# Patient Record
Sex: MALE | Race: OTHER | HISPANIC OR LATINO | URBAN - METROPOLITAN AREA
[De-identification: names, ages, dates, MRNs, and addresses within clinical notes are randomized per-mention and may not be internally consistent; named-entity substitution may affect disease eponyms.]

---

## 2021-07-02 ENCOUNTER — EMERGENCY (EMERGENCY)
Facility: HOSPITAL | Age: 26
LOS: 1 days | Discharge: HOME | End: 2021-07-02
Attending: STUDENT IN AN ORGANIZED HEALTH CARE EDUCATION/TRAINING PROGRAM
Payer: MEDICAID

## 2021-07-02 VITALS
SYSTOLIC BLOOD PRESSURE: 107 MMHG | DIASTOLIC BLOOD PRESSURE: 67 MMHG | HEART RATE: 73 BPM | HEIGHT: 67 IN | OXYGEN SATURATION: 97 % | WEIGHT: 257.94 LBS | TEMPERATURE: 98 F | RESPIRATION RATE: 16 BRPM

## 2021-07-02 PROCEDURE — 71046 X-RAY EXAM CHEST 2 VIEWS: CPT | Mod: 26

## 2021-07-02 PROCEDURE — 99284 EMERGENCY DEPT VISIT MOD MDM: CPT

## 2021-07-02 NOTE — ED PROVIDER NOTE - NSFOLLOWUPINSTRUCTIONS_ED_ALL_ED_FT
You were seen in the emergency room today for cough. Your chest xray is normal. Please call for your viral swab results in the morning and call your primary doctor to inform them of this ER visit and obtain the next available appointment within the next 5 days. As we discussed, return to the ER if you have any worsening symptoms.    We no longer feel that you need further emergency care or admission to the hospital at this time.    While we have determined that you are currently stable for discharge, we know that things can change. Please seek immediate medical attention or return to the ER if you experience any of the following:  Any worsening or persistent symptoms  Severe Pain  Chest Pain  Difficulty Breathing  Bleeding  Passing Out  Severe Rash  Inability to Eat or Drink  Persistent Fever    Please see a primary care doctor or specialist within 5 days to ensure that you are improving.    Please call the [employer] phone numbers on this document if you have any problems obtaining a follow up appointment.    I wish you well! -Dr Page

## 2021-07-02 NOTE — ED PROVIDER NOTE - OBJECTIVE STATEMENT
24 y/o male with history of anxiety/depression, presenting with 2-3 days of dry cough, nasal congestion, and nausea without vomiting and no other associated symptoms. Patient took no meds prior to arrival. Patient denies any obvious sick contacts or international travel. Patient denies sore throat, pain anywhere, vomiting, diarrhea, chest pain, shortness of breath, fever, syncope, or any other recent illnesses and hospitalizations.

## 2021-07-02 NOTE — ED PROVIDER NOTE - PATIENT PORTAL LINK FT
You can access the FollowMyHealth Patient Portal offered by [facility] by registering at the following website: [email or web address]. By joining [employer]’s FollowMyHealth portal, you will also be able to view your health information using other applications (apps) compatible with our system.

## 2021-07-02 NOTE — ED PROVIDER NOTE - PHYSICAL EXAMINATION
Vital Signs Reviewed  GEN: Comfortable, NAD, AAOx3  HEENT: NCAT, MMM, Neck Supple, rhinorrhea,  RESP: CTAB, No rales/rhonchi/wheezing  CV: RRR, S1S2, No murmurs  ABD: No TTP, Soft, ND, No masses, No CVA Tenderness  Extrem/Skin: Equal pulses bilat, No cyanosis/edema/rashes  Neuro: No focal deficits Vital Signs Reviewed  GEN: Comfortable, NAD, AAOx3  HEENT: NCAT, MMM, Neck Supple, Rhinorrhea,  RESP: CTAB, No rales/rhonchi/wheezing  CV: RRR, S1S2, No murmurs  ABD: No TTP, Soft, ND, No masses, No CVA Tenderness  Extrem/Skin: Equal pulses bilat, No cyanosis/edema/rashes  Neuro: No focal deficits

## 2021-07-02 NOTE — ED PROVIDER NOTE - CLINICAL SUMMARY MEDICAL DECISION MAKING FREE TEXT BOX
Patient presenting with 2-3 days of viral symptoms. Will obtain CXR, viral panel. Patient stable and will reassess. Patient presenting with 2-3 days of viral symptoms. Will obtain CXR, viral panel. Patient stable and will reassess.    CXR wnl and RN to swab pt. No new symptoms on reassessment. Pt to call for swab results. Most likely viral URI vs other non emergent etiology of symptoms- the details of the case, history, and exam make more emergent diagnoses much less likely. Discussed with pt my clinical impression and results, patient given strict return precautions if persistent or worsening of symptoms occurs, and need for close follow up. Pt expressed understanding and agrees with plan. Pt is well appearing with a reassuring exam. Discharge home with PMD or Specialist f/u within 5 days.

## 2021-07-03 LAB
RAPID RVP RESULT: SIGNIFICANT CHANGE UP
SARS-COV-2 RNA SPEC QL NAA+PROBE: SIGNIFICANT CHANGE UP

## 2021-07-03 PROCEDURE — 99283 EMERGENCY DEPT VISIT LOW MDM: CPT | Mod: 25

## 2021-07-03 PROCEDURE — 71046 X-RAY EXAM CHEST 2 VIEWS: CPT

## 2021-07-03 PROCEDURE — 0225U NFCT DS DNA&RNA 21 SARSCOV2: CPT

## 2021-08-01 ENCOUNTER — EMERGENCY (EMERGENCY)
Facility: HOSPITAL | Age: 26
LOS: 1 days | Discharge: HOME | End: 2021-08-01
Attending: STUDENT IN AN ORGANIZED HEALTH CARE EDUCATION/TRAINING PROGRAM
Payer: MEDICAID

## 2021-08-01 VITALS
OXYGEN SATURATION: 94 % | SYSTOLIC BLOOD PRESSURE: 109 MMHG | HEART RATE: 78 BPM | WEIGHT: 235.89 LBS | RESPIRATION RATE: 18 BRPM | HEIGHT: 67 IN | TEMPERATURE: 98 F | DIASTOLIC BLOOD PRESSURE: 71 MMHG

## 2021-08-01 LAB
ALBUMIN SERPL ELPH-MCNC: 3.9 G/DL — SIGNIFICANT CHANGE UP (ref 3.5–5)
ALP SERPL-CCNC: 121 U/L — HIGH (ref 40–120)
ALT FLD-CCNC: 26 U/L DA — SIGNIFICANT CHANGE UP (ref 10–60)
ANION GAP SERPL CALC-SCNC: 8 MMOL/L — SIGNIFICANT CHANGE UP (ref 5–17)
AST SERPL-CCNC: 16 U/L — SIGNIFICANT CHANGE UP (ref 10–40)
BASOPHILS # BLD AUTO: 0.05 K/UL — SIGNIFICANT CHANGE UP (ref 0–0.2)
BASOPHILS NFR BLD AUTO: 0.9 % — SIGNIFICANT CHANGE UP (ref 0–2)
BILIRUB SERPL-MCNC: 0.4 MG/DL — SIGNIFICANT CHANGE UP (ref 0.2–1.2)
BUN SERPL-MCNC: 12 MG/DL — SIGNIFICANT CHANGE UP (ref 7–18)
CALCIUM SERPL-MCNC: 9.1 MG/DL — SIGNIFICANT CHANGE UP (ref 8.4–10.5)
CHLORIDE SERPL-SCNC: 106 MMOL/L — SIGNIFICANT CHANGE UP (ref 96–108)
CO2 SERPL-SCNC: 26 MMOL/L — SIGNIFICANT CHANGE UP (ref 22–31)
CREAT SERPL-MCNC: 0.94 MG/DL — SIGNIFICANT CHANGE UP (ref 0.5–1.3)
EOSINOPHIL # BLD AUTO: 0.17 K/UL — SIGNIFICANT CHANGE UP (ref 0–0.5)
EOSINOPHIL NFR BLD AUTO: 3 % — SIGNIFICANT CHANGE UP (ref 0–6)
GLUCOSE SERPL-MCNC: 81 MG/DL — SIGNIFICANT CHANGE UP (ref 70–99)
HCT VFR BLD CALC: 45.2 % — SIGNIFICANT CHANGE UP (ref 39–50)
HGB BLD-MCNC: 14.6 G/DL — SIGNIFICANT CHANGE UP (ref 13–17)
IMM GRANULOCYTES NFR BLD AUTO: 0.4 % — SIGNIFICANT CHANGE UP (ref 0–1.5)
LYMPHOCYTES # BLD AUTO: 1.4 K/UL — SIGNIFICANT CHANGE UP (ref 1–3.3)
LYMPHOCYTES # BLD AUTO: 24.6 % — SIGNIFICANT CHANGE UP (ref 13–44)
MCHC RBC-ENTMCNC: 27.1 PG — SIGNIFICANT CHANGE UP (ref 27–34)
MCHC RBC-ENTMCNC: 32.3 GM/DL — SIGNIFICANT CHANGE UP (ref 32–36)
MCV RBC AUTO: 84 FL — SIGNIFICANT CHANGE UP (ref 80–100)
MONOCYTES # BLD AUTO: 0.4 K/UL — SIGNIFICANT CHANGE UP (ref 0–0.9)
MONOCYTES NFR BLD AUTO: 7 % — SIGNIFICANT CHANGE UP (ref 2–14)
NEUTROPHILS # BLD AUTO: 3.65 K/UL — SIGNIFICANT CHANGE UP (ref 1.8–7.4)
NEUTROPHILS NFR BLD AUTO: 64.1 % — SIGNIFICANT CHANGE UP (ref 43–77)
NRBC # BLD: 0 /100 WBCS — SIGNIFICANT CHANGE UP (ref 0–0)
PLATELET # BLD AUTO: 243 K/UL — SIGNIFICANT CHANGE UP (ref 150–400)
POTASSIUM SERPL-MCNC: 4 MMOL/L — SIGNIFICANT CHANGE UP (ref 3.5–5.3)
POTASSIUM SERPL-SCNC: 4 MMOL/L — SIGNIFICANT CHANGE UP (ref 3.5–5.3)
PROT SERPL-MCNC: 7.6 G/DL — SIGNIFICANT CHANGE UP (ref 6–8.3)
RBC # BLD: 5.38 M/UL — SIGNIFICANT CHANGE UP (ref 4.2–5.8)
RBC # FLD: 14.6 % — HIGH (ref 10.3–14.5)
SARS-COV-2 RNA SPEC QL NAA+PROBE: SIGNIFICANT CHANGE UP
SODIUM SERPL-SCNC: 140 MMOL/L — SIGNIFICANT CHANGE UP (ref 135–145)
WBC # BLD: 5.69 K/UL — SIGNIFICANT CHANGE UP (ref 3.8–10.5)
WBC # FLD AUTO: 5.69 K/UL — SIGNIFICANT CHANGE UP (ref 3.8–10.5)

## 2021-08-01 PROCEDURE — 99284 EMERGENCY DEPT VISIT MOD MDM: CPT

## 2021-08-01 PROCEDURE — 71045 X-RAY EXAM CHEST 1 VIEW: CPT | Mod: 26

## 2021-08-01 RX ORDER — SODIUM CHLORIDE 9 MG/ML
1000 INJECTION INTRAMUSCULAR; INTRAVENOUS; SUBCUTANEOUS ONCE
Refills: 0 | Status: COMPLETED | OUTPATIENT
Start: 2021-08-01 | End: 2021-08-01

## 2021-08-01 RX ORDER — KETOROLAC TROMETHAMINE 30 MG/ML
15 SYRINGE (ML) INJECTION ONCE
Refills: 0 | Status: DISCONTINUED | OUTPATIENT
Start: 2021-08-01 | End: 2021-08-01

## 2021-08-01 RX ORDER — ONDANSETRON 8 MG/1
4 TABLET, FILM COATED ORAL ONCE
Refills: 0 | Status: COMPLETED | OUTPATIENT
Start: 2021-08-01 | End: 2021-08-01

## 2021-08-01 RX ORDER — CLONAZEPAM 1 MG
0.5 TABLET ORAL ONCE
Refills: 0 | Status: DISCONTINUED | OUTPATIENT
Start: 2021-08-01 | End: 2021-08-01

## 2021-08-01 RX ADMIN — Medication 0.5 MILLIGRAM(S): at 23:43

## 2021-08-01 RX ADMIN — SODIUM CHLORIDE 1000 MILLILITER(S): 9 INJECTION INTRAMUSCULAR; INTRAVENOUS; SUBCUTANEOUS at 22:12

## 2021-08-01 RX ADMIN — Medication 15 MILLIGRAM(S): at 23:43

## 2021-08-01 RX ADMIN — ONDANSETRON 4 MILLIGRAM(S): 8 TABLET, FILM COATED ORAL at 23:43

## 2021-08-02 ENCOUNTER — EMERGENCY (EMERGENCY)
Facility: HOSPITAL | Age: 26
LOS: 1 days | Discharge: HOME | End: 2021-08-02
Attending: EMERGENCY MEDICINE
Payer: MEDICAID

## 2021-08-02 VITALS
HEART RATE: 85 BPM | OXYGEN SATURATION: 98 % | DIASTOLIC BLOOD PRESSURE: 67 MMHG | SYSTOLIC BLOOD PRESSURE: 114 MMHG | RESPIRATION RATE: 17 BRPM | HEIGHT: 67 IN | TEMPERATURE: 98 F | WEIGHT: 235.89 LBS

## 2021-08-02 VITALS
RESPIRATION RATE: 18 BRPM | HEART RATE: 77 BPM | DIASTOLIC BLOOD PRESSURE: 75 MMHG | TEMPERATURE: 98 F | SYSTOLIC BLOOD PRESSURE: 110 MMHG | OXYGEN SATURATION: 95 %

## 2021-08-02 PROBLEM — F41.9 ANXIETY DISORDER, UNSPECIFIED: Chronic | Status: ACTIVE | Noted: 2021-07-03

## 2021-08-02 PROBLEM — F32.9 MAJOR DEPRESSIVE DISORDER, SINGLE EPISODE, UNSPECIFIED: Chronic | Status: ACTIVE | Noted: 2021-07-03

## 2021-08-02 LAB
APPEARANCE UR: CLEAR — SIGNIFICANT CHANGE UP
BACTERIA # UR AUTO: ABNORMAL /HPF
BILIRUB UR-MCNC: NEGATIVE — SIGNIFICANT CHANGE UP
COLOR SPEC: YELLOW — SIGNIFICANT CHANGE UP
DIFF PNL FLD: NEGATIVE — SIGNIFICANT CHANGE UP
EPI CELLS # UR: SIGNIFICANT CHANGE UP /HPF
GLUCOSE UR QL: NEGATIVE — SIGNIFICANT CHANGE UP
KETONES UR-MCNC: NEGATIVE — SIGNIFICANT CHANGE UP
LEUKOCYTE ESTERASE UR-ACNC: NEGATIVE — SIGNIFICANT CHANGE UP
NITRITE UR-MCNC: NEGATIVE — SIGNIFICANT CHANGE UP
PH UR: 5 — SIGNIFICANT CHANGE UP (ref 5–8)
PROT UR-MCNC: NEGATIVE — SIGNIFICANT CHANGE UP
RBC CASTS # UR COMP ASSIST: SIGNIFICANT CHANGE UP /HPF (ref 0–2)
SP GR SPEC: 1.01 — SIGNIFICANT CHANGE UP (ref 1.01–1.02)
UROBILINOGEN FLD QL: NEGATIVE — SIGNIFICANT CHANGE UP
WBC UR QL: SIGNIFICANT CHANGE UP /HPF (ref 0–5)

## 2021-08-02 PROCEDURE — 85025 COMPLETE CBC W/AUTO DIFF WBC: CPT

## 2021-08-02 PROCEDURE — 96374 THER/PROPH/DIAG INJ IV PUSH: CPT

## 2021-08-02 PROCEDURE — 81001 URINALYSIS AUTO W/SCOPE: CPT

## 2021-08-02 PROCEDURE — 96375 TX/PRO/DX INJ NEW DRUG ADDON: CPT

## 2021-08-02 PROCEDURE — 99285 EMERGENCY DEPT VISIT HI MDM: CPT

## 2021-08-02 PROCEDURE — 99284 EMERGENCY DEPT VISIT MOD MDM: CPT | Mod: 25

## 2021-08-02 PROCEDURE — 87635 SARS-COV-2 COVID-19 AMP PRB: CPT

## 2021-08-02 PROCEDURE — 87086 URINE CULTURE/COLONY COUNT: CPT

## 2021-08-02 PROCEDURE — 80053 COMPREHEN METABOLIC PANEL: CPT

## 2021-08-02 PROCEDURE — 71045 X-RAY EXAM CHEST 1 VIEW: CPT

## 2021-08-02 PROCEDURE — 36415 COLL VENOUS BLD VENIPUNCTURE: CPT

## 2021-08-02 PROCEDURE — 93005 ELECTROCARDIOGRAM TRACING: CPT

## 2021-08-02 RX ORDER — KETOROLAC TROMETHAMINE 30 MG/ML
30 SYRINGE (ML) INJECTION ONCE
Refills: 0 | Status: DISCONTINUED | OUTPATIENT
Start: 2021-08-02 | End: 2021-08-02

## 2021-08-02 RX ORDER — ONDANSETRON 8 MG/1
4 TABLET, FILM COATED ORAL ONCE
Refills: 0 | Status: COMPLETED | OUTPATIENT
Start: 2021-08-02 | End: 2021-08-03

## 2021-08-02 NOTE — ED PROVIDER NOTE - PHYSICAL EXAMINATION
Vital Signs Reviewed  GEN: Comfortable, Conversant in full sentences, NAD, AAOx3  HEENT: NCAT, MMM, Neck Supple  RESP: CTAB, No rales/rhonchi/wheezing  CV: RRR, S1S2, No murmurs  ABD: No TTP, Soft, ND, No masses, No CVA Tenderness  Extrem/Skin: Equal pulses bilat, No cyanosis/edema/rashes  Neuro: No focal deficits

## 2021-08-02 NOTE — ED PROVIDER NOTE - OBJECTIVE STATEMENT
26 yo M h/o anxiety/depression p/w 3 days of viral symptoms including nasal congestion, cough, nausea w/o vomiting, and intermittent chest pain with coughing. No recent fever/sick contacts/intl travel. Pt also c/o intermittent dysuria and requesting his nightly anxiety med. Pt denies recent diarrhea, SOB, focal numbness/weakness, syncope, other recent illness or hospitalizations.

## 2021-08-02 NOTE — ED ADULT TRIAGE NOTE - CHIEF COMPLAINT QUOTE
BIBEMS for chest pain since yesterday with nausea.  He states that he also has epigastric pain, dizziness and weakness; denies sick contact.  H/O Anxiety, schizophrenia; taking Clonazepam, Escitalopram.  He appears calm, breathing easily, no guarding, and speaking in full sentences.

## 2021-08-02 NOTE — ED PROVIDER NOTE - NS ED ROS FT
Patient Reports No  Fever/Chills  Vomiting/Diarrhea  Shortness of Breath  Syncope, Focal Numbness or Weakness  Other Recent Illness or Hospitalizations

## 2021-08-02 NOTE — ED PROVIDER NOTE - CLINICAL SUMMARY MEDICAL DECISION MAKING FREE TEXT BOX
Pt p/w several days of viral symptoms also c/o dysuria. Initially 94% on RA and otw completely benign exam. Labs and urine wnl. EKG and CXR unremarkable.     On reassessment pt states that he's feeling well. Rec PMD f/u within 3 days. Most likely non emergent etiology of symptoms- the details of the case, history, and exam make more emergent diagnoses much less likely. Discussed with pt my clinical impression and results, patient given strict return precautions if persistent or worsening of symptoms occurs, and need for close follow up. Pt expressed understanding and agrees with plan. Pt is well appearing with a reassuring exam. Discharge home with PMD or Specialist f/u within 3 days.

## 2021-08-02 NOTE — ED PROVIDER NOTE - PATIENT PORTAL LINK FT
You can access the FollowMyHealth Patient Portal offered by [facility] by registering at the following website: [email or web address]. By joining [employer]’s FollowMyHealth portal, you will also be able to view your health information using other applications (apps) compatible with our system.

## 2021-08-03 VITALS
DIASTOLIC BLOOD PRESSURE: 74 MMHG | HEART RATE: 76 BPM | TEMPERATURE: 98 F | OXYGEN SATURATION: 98 % | RESPIRATION RATE: 17 BRPM | SYSTOLIC BLOOD PRESSURE: 110 MMHG

## 2021-08-03 LAB
ALBUMIN SERPL ELPH-MCNC: 3.8 G/DL — SIGNIFICANT CHANGE UP (ref 3.5–5)
ALP SERPL-CCNC: 121 U/L — HIGH (ref 40–120)
ALT FLD-CCNC: 27 U/L DA — SIGNIFICANT CHANGE UP (ref 10–60)
ANION GAP SERPL CALC-SCNC: 8 MMOL/L — SIGNIFICANT CHANGE UP (ref 5–17)
AST SERPL-CCNC: 17 U/L — SIGNIFICANT CHANGE UP (ref 10–40)
BASOPHILS # BLD AUTO: 0.07 K/UL — SIGNIFICANT CHANGE UP (ref 0–0.2)
BASOPHILS NFR BLD AUTO: 1.2 % — SIGNIFICANT CHANGE UP (ref 0–2)
BILIRUB SERPL-MCNC: 0.4 MG/DL — SIGNIFICANT CHANGE UP (ref 0.2–1.2)
BUN SERPL-MCNC: 16 MG/DL — SIGNIFICANT CHANGE UP (ref 7–18)
CALCIUM SERPL-MCNC: 9.3 MG/DL — SIGNIFICANT CHANGE UP (ref 8.4–10.5)
CHLORIDE SERPL-SCNC: 107 MMOL/L — SIGNIFICANT CHANGE UP (ref 96–108)
CO2 SERPL-SCNC: 27 MMOL/L — SIGNIFICANT CHANGE UP (ref 22–31)
CREAT SERPL-MCNC: 1.18 MG/DL — SIGNIFICANT CHANGE UP (ref 0.5–1.3)
CULTURE RESULTS: SIGNIFICANT CHANGE UP
D DIMER BLD IA.RAPID-MCNC: <150 NG/ML DDU — SIGNIFICANT CHANGE UP
EOSINOPHIL # BLD AUTO: 0.02 K/UL — SIGNIFICANT CHANGE UP (ref 0–0.5)
EOSINOPHIL NFR BLD AUTO: 0.4 % — SIGNIFICANT CHANGE UP (ref 0–6)
GLUCOSE SERPL-MCNC: 90 MG/DL — SIGNIFICANT CHANGE UP (ref 70–99)
HCT VFR BLD CALC: 45.8 % — SIGNIFICANT CHANGE UP (ref 39–50)
HGB BLD-MCNC: 14.4 G/DL — SIGNIFICANT CHANGE UP (ref 13–17)
IMM GRANULOCYTES NFR BLD AUTO: 0.2 % — SIGNIFICANT CHANGE UP (ref 0–1.5)
LIDOCAIN IGE QN: 127 U/L — SIGNIFICANT CHANGE UP (ref 73–393)
LYMPHOCYTES # BLD AUTO: 1.73 K/UL — SIGNIFICANT CHANGE UP (ref 1–3.3)
LYMPHOCYTES # BLD AUTO: 30.6 % — SIGNIFICANT CHANGE UP (ref 13–44)
MCHC RBC-ENTMCNC: 26.6 PG — LOW (ref 27–34)
MCHC RBC-ENTMCNC: 31.4 GM/DL — LOW (ref 32–36)
MCV RBC AUTO: 84.7 FL — SIGNIFICANT CHANGE UP (ref 80–100)
MONOCYTES # BLD AUTO: 0.58 K/UL — SIGNIFICANT CHANGE UP (ref 0–0.9)
MONOCYTES NFR BLD AUTO: 10.2 % — SIGNIFICANT CHANGE UP (ref 2–14)
NEUTROPHILS # BLD AUTO: 3.25 K/UL — SIGNIFICANT CHANGE UP (ref 1.8–7.4)
NEUTROPHILS NFR BLD AUTO: 57.4 % — SIGNIFICANT CHANGE UP (ref 43–77)
NRBC # BLD: 0 /100 WBCS — SIGNIFICANT CHANGE UP (ref 0–0)
PLATELET # BLD AUTO: 258 K/UL — SIGNIFICANT CHANGE UP (ref 150–400)
POTASSIUM SERPL-MCNC: 4.2 MMOL/L — SIGNIFICANT CHANGE UP (ref 3.5–5.3)
POTASSIUM SERPL-SCNC: 4.2 MMOL/L — SIGNIFICANT CHANGE UP (ref 3.5–5.3)
PROT SERPL-MCNC: 7.8 G/DL — SIGNIFICANT CHANGE UP (ref 6–8.3)
RBC # BLD: 5.41 M/UL — SIGNIFICANT CHANGE UP (ref 4.2–5.8)
RBC # FLD: 14.3 % — SIGNIFICANT CHANGE UP (ref 10.3–14.5)
SODIUM SERPL-SCNC: 142 MMOL/L — SIGNIFICANT CHANGE UP (ref 135–145)
SPECIMEN SOURCE: SIGNIFICANT CHANGE UP
TROPONIN I SERPL-MCNC: <0.015 NG/ML — SIGNIFICANT CHANGE UP (ref 0–0.04)
WBC # BLD: 5.66 K/UL — SIGNIFICANT CHANGE UP (ref 3.8–10.5)
WBC # FLD AUTO: 5.66 K/UL — SIGNIFICANT CHANGE UP (ref 3.8–10.5)

## 2021-08-03 PROCEDURE — 96374 THER/PROPH/DIAG INJ IV PUSH: CPT

## 2021-08-03 PROCEDURE — 93005 ELECTROCARDIOGRAM TRACING: CPT

## 2021-08-03 PROCEDURE — 80053 COMPREHEN METABOLIC PANEL: CPT

## 2021-08-03 PROCEDURE — 85025 COMPLETE CBC W/AUTO DIFF WBC: CPT

## 2021-08-03 PROCEDURE — 74176 CT ABD & PELVIS W/O CONTRAST: CPT | Mod: 26,MA

## 2021-08-03 PROCEDURE — 84484 ASSAY OF TROPONIN QUANT: CPT

## 2021-08-03 PROCEDURE — 71045 X-RAY EXAM CHEST 1 VIEW: CPT | Mod: 26

## 2021-08-03 PROCEDURE — 96375 TX/PRO/DX INJ NEW DRUG ADDON: CPT

## 2021-08-03 PROCEDURE — 71045 X-RAY EXAM CHEST 1 VIEW: CPT

## 2021-08-03 PROCEDURE — 36415 COLL VENOUS BLD VENIPUNCTURE: CPT

## 2021-08-03 PROCEDURE — 83690 ASSAY OF LIPASE: CPT

## 2021-08-03 PROCEDURE — 99284 EMERGENCY DEPT VISIT MOD MDM: CPT | Mod: 25

## 2021-08-03 PROCEDURE — 85379 FIBRIN DEGRADATION QUANT: CPT

## 2021-08-03 PROCEDURE — 74176 CT ABD & PELVIS W/O CONTRAST: CPT | Mod: MA

## 2021-08-03 RX ADMIN — ONDANSETRON 4 MILLIGRAM(S): 8 TABLET, FILM COATED ORAL at 00:28

## 2021-08-03 RX ADMIN — Medication 30 MILLIGRAM(S): at 00:28

## 2021-08-03 NOTE — ED PROVIDER NOTE - PATIENT PORTAL LINK FT
You can access the FollowMyHealth Patient Portal offered by [facility] by registering at the following website: [email or web address]. By joining [employer]’s FollowMyHealth portal, you will also be able to view your health information using other applications (apps) compatible with our system.

## 2021-08-03 NOTE — ED PROVIDER NOTE - OBJECTIVE STATEMENT
24yo M with hx anxiety and depression presents with cough, chest pain, abdominal pain and nausea. Reports onset 1 week ago. Seen in ED yesterday but reports symptoms worsened. Denies sputum production, fever, vomiting, diarrhea or urinary symptoms. Denies sick contacts or recent travel.

## 2021-08-03 NOTE — ED PROVIDER NOTE - NSFOLLOWUPINSTRUCTIONS_ED_ALL_ED_FT
Abdominal Pain    WHAT YOU NEED TO KNOW:    Abdominal pain can be dull, achy, or sharp. You may have pain in one area of your abdomen, or in your entire abdomen. Your pain may be caused by a condition such as constipation, food sensitivity or poisoning, infection, or a blockage. Abdominal pain can also be from a hernia, appendicitis, or an ulcer. Liver, gallbladder, or kidney conditions can also cause abdominal pain. The cause of your abdominal pain may not be known.    Abdominal Organs         DISCHARGE INSTRUCTIONS:    Call your local emergency number (911 in the [place]) if:   •You have new chest pain or shortness of breath.          Return to the emergency department if:   •You have pulsing pain in your upper abdomen or lower back that suddenly becomes constant.      •Your pain is in the right lower abdominal area and worsens with movement.      •You have a fever over 100.4°F (38°C) or shaking chills.      •You are vomiting and cannot keep food or liquids down.      •Your pain does not improve or gets worse over the next 8 to 12 hours.      •You see blood in your vomit or bowel movements, or they look black and tarry.      •Your skin or the whites of your eyes turn yellow.      •You are a woman and have a large amount of vaginal bleeding that is not your monthly period.      Call your doctor if:   •You have pain in your lower back.      •You are a man and have pain in your testicles.      •You have pain when you urinate.      •You have questions or concerns about your condition or care.      Medicines:   •Prescription pain medicine may be given. Ask your healthcare provider how to take this medicine safely. Some prescription pain medicines contain acetaminophen. Do not take other medicines that contain acetaminophen without talking to your healthcare provider. Too much acetaminophen may cause liver damage. Prescription pain medicine may cause constipation. Ask your healthcare provider how to prevent or treat constipation.       •Medicines may be given to calm your stomach or prevent vomiting.      •Take your medicine as directed. Contact your healthcare provider if you think your medicine is not helping or if you have side effects. Tell him of her if you are allergic to any medicine. Keep a list of the medicines, vitamins, and herbs you take. Include the amounts, and when and why you take them. Bring the list or the pill bottles to follow-up visits. Carry your medicine list with you in case of an emergency.      Manage your symptoms:   •Apply heat on your abdomen for 20 to 30 minutes every 2 hours for as many days as directed. Heat helps decrease pain and muscle spasms.      •Make changes to the food you eat, if needed. Do not eat foods that cause abdominal pain or other symptoms. Eat small meals more often. The following changes may also help:?Eat more high-fiber foods if you are constipated. High-fiber foods include fruits, vegetables, whole-grain foods, and legumes.             ?Do not eat foods that cause gas if you have bloating. Examples include broccoli, cabbage, and cauliflower. Do not drink soda or carbonated drinks. These may also cause gas.      ?Do not eat foods or drinks that contain sorbitol or fructose if you have diarrhea and bloating. Some examples are fruit juices, candy, jelly, and sugar-free gum.      ?Do not eat high-fat foods. Examples include fried foods, cheeseburgers, hot dogs, and desserts.      ?Limit or do not have caffeine. Caffeine may make symptoms such as heartburn or nausea worse.      ?Drink more liquids to prevent dehydration from diarrhea or vomiting. Ask your healthcare provider how much liquid to drink each day and which liquids are best for you.      •Keep a diary of your abdominal pain. A diary may help your healthcare provider learn what is causing your abdominal pain. Include when the pain happens, how long it lasts, and what the pain feels like. Write down any other symptoms you have with abdominal pain. Also write down what you eat, and what symptoms you have after you eat.      •Manage your stress. Stress may cause abdominal pain. Your healthcare provider may recommend relaxation techniques and deep breathing exercises to help decrease your stress. Your healthcare provider may recommend you talk to someone about your stress or anxiety, such as a counselor or a trusted friend. Get plenty of sleep and exercise regularly.  [employer] Walking for Exercise           •Limit or do not drink alcohol. Alcohol can make your abdominal pain worse. Ask your healthcare provider if it is safe for you to drink alcohol. Also ask how much is safe for you to drink.      •Do not smoke. Nicotine and other chemicals in cigarettes can damage your esophagus and stomach. Ask your healthcare provider for information if you currently smoke and need help to quit. E-cigarettes or smokeless tobacco still contain nicotine. Talk to your healthcare provider before you use these products.      Follow up with your doctor within 24 hours or as directed: Write down your questions so you remember to ask them during your visits.

## 2021-08-03 NOTE — ED PROVIDER NOTE - CLINICAL SUMMARY MEDICAL DECISION MAKING FREE TEXT BOX
24yo M with hx anxiety and depression presents with cough, chest pain, abdominal pain and nausea. Will obtain ekg, labs, CXR, CT A/P. Given IVF, zofran and toradol, will reassess. 26yo M with hx anxiety and depression presents with cough, chest pain, abdominal pain and nausea. Will obtain ekg, labs, CXR, CT A/P. Given IVF, zofran and toradol, will reassess.    labs and CXR unremarkable,  CT A/P shows No acute abdominal pathology.  Discussed above with patient. Patient's mother called reporting he has hx paranoid schizophrenia and has not been taking his medication and did not sleep yesterday. Reports his mental health specialist recommended he get evaluated by psychiatry at [facility]. I discussed with her that I cannot transfer him to [place] for psych evaluation that can be performed in our ED and he cannot be transferred without his consent. Mother reports she will arrange for his outpatient mental health specialist to further assess him.  On reevaluation patient denies SI/HI and declines telepsych evaluation. patient stable for discharge.

## 2021-08-06 ENCOUNTER — EMERGENCY (EMERGENCY)
Facility: HOSPITAL | Age: 26
LOS: 1 days | Discharge: HOME | End: 2021-08-06
Attending: EMERGENCY MEDICINE
Payer: MEDICAID

## 2021-08-06 VITALS
SYSTOLIC BLOOD PRESSURE: 114 MMHG | WEIGHT: 238.1 LBS | HEIGHT: 67 IN | RESPIRATION RATE: 17 BRPM | TEMPERATURE: 98 F | OXYGEN SATURATION: 98 % | HEART RATE: 102 BPM | DIASTOLIC BLOOD PRESSURE: 74 MMHG

## 2021-08-06 LAB
APPEARANCE UR: CLEAR — SIGNIFICANT CHANGE UP
BACTERIA # UR AUTO: ABNORMAL /HPF
BILIRUB UR-MCNC: NEGATIVE — SIGNIFICANT CHANGE UP
COLOR SPEC: YELLOW — SIGNIFICANT CHANGE UP
DIFF PNL FLD: NEGATIVE — SIGNIFICANT CHANGE UP
EPI CELLS # UR: SIGNIFICANT CHANGE UP /HPF
GLUCOSE UR QL: NEGATIVE — SIGNIFICANT CHANGE UP
KETONES UR-MCNC: ABNORMAL
LEUKOCYTE ESTERASE UR-ACNC: NEGATIVE — SIGNIFICANT CHANGE UP
NITRITE UR-MCNC: NEGATIVE — SIGNIFICANT CHANGE UP
PH UR: 5 — SIGNIFICANT CHANGE UP (ref 5–8)
PROT UR-MCNC: 30 MG/DL
SP GR SPEC: 1.03 — HIGH (ref 1.01–1.02)
UROBILINOGEN FLD QL: NEGATIVE — SIGNIFICANT CHANGE UP
WBC UR QL: ABNORMAL /HPF (ref 0–5)

## 2021-08-06 PROCEDURE — 99283 EMERGENCY DEPT VISIT LOW MDM: CPT

## 2021-08-06 PROCEDURE — 87086 URINE CULTURE/COLONY COUNT: CPT

## 2021-08-06 PROCEDURE — 81001 URINALYSIS AUTO W/SCOPE: CPT

## 2021-08-06 PROCEDURE — 99284 EMERGENCY DEPT VISIT MOD MDM: CPT

## 2021-08-06 RX ADMIN — Medication 1 MILLIGRAM(S): at 19:41

## 2021-08-06 NOTE — ED PROVIDER NOTE - PATIENT PORTAL LINK FT
You can access the FollowMyHealth Patient Portal offered by [facility] by registering at the following website: [email or web address]. By joining [employer]’s FollowMyHealth portal, you will also be able to view your health information using other applications (apps) compatible with our system.

## 2021-08-06 NOTE — ED PROVIDER NOTE - CLINICAL SUMMARY MEDICAL DECISION MAKING FREE TEXT BOX
25 yr old male with hx of anxiety presents to ed c/o sob x 1 wk, not feeling well, dysuria, abd discomfort, fatigue, and wants to get a general check up. pt seen 3 days ago and had full work up and ct abd with no acute findings. pt denies any si/hi, drug use, alcohol use. no sti concerns    will r/o uti although I suspect supratentorial cause possibly somatoform - check ua, give ativan. give pcp referral.

## 2021-08-06 NOTE — ED ADULT NURSE NOTE - NSIMPLEMENTINTERV_GEN_ALL_ED
Implemented All Universal Safety Interventions:  [place] to call system. Call bell, personal items and telephone within reach. Instruct patient to call for assistance. Room bathroom lighting operational. Non-slip footwear when patient is off stretcher. Physically safe environment: no spills, clutter or unnecessary equipment. Stretcher in lowest position, wheels locked, appropriate side rails in place.

## 2021-08-06 NOTE — ED ADULT NURSE NOTE - OBJECTIVE STATEMENT
AS per pt, c/o pain upon urination and difficulty breathing x1 week. PT is able to speak in complete sentences and ambulate w/out difficulty or dyspneic on exertion. PT denies all other symptoms.

## 2021-08-06 NOTE — ED PROVIDER NOTE - OBJECTIVE STATEMENT
25 yr old male with hx of anxiety presents to ed c/o sob x 1 wk, not feeling well, dysuria, abd discomfort, fatigue, and wants to get a general check up. pt seen 3 days ago and had full work up and ct abd with no acute findings. pt denies any si/hi, drug use, alcohol use. no sti concerns

## 2021-08-06 NOTE — ED PROVIDER NOTE - PROGRESS NOTE DETAILS
anderson: urine clean. pt continously requesting to have ed perform full physical. pt informed needs to see primary MD. I highly suspect no organic cause of his sx rather psych induced. list of MD given. ua clean

## 2021-08-07 LAB
CULTURE RESULTS: NO GROWTH — SIGNIFICANT CHANGE UP
SPECIMEN SOURCE: SIGNIFICANT CHANGE UP

## 2022-02-28 NOTE — ED ADULT NURSE NOTE - EXTENSIONS OF SELF_ADULT
Postpartum Note    S: No acute events overnight. Pt reports that her pain has been well controlled. She has been up and ambulating in her room  She has been breastfeeding and is undecided on contraception at this time; she would like to discuss at her postpartum visits. Tolerating general diet well. Endorses moderate lochia. She is voiding spontaneously. +flatus -BM. Denies cp, sob, n/v/f/c, headache or calf tenderness.  She would like to go home today if possible.    O:  Vitals:    22 0339   BP: 99/64   Pulse: 63   Resp: 16   Temp: 98.1 °F (36.7 °C)       Gen: resting comfortably in bed, nad, conversant, pleasant    Resp: unlabored  CV: normal pulse  Abd:soft, mild tenderness to palpation, uterine fundus firm,  incision c/d/i with staples  Extrem: no calf tenderness to palpation, trace edema    Lab Results   Component Value Date    WBC 12.6 (H) 2022    HGB 9.2 (L) 2022    HCT 28.5 (L) 2022    MCV 80.7 2022     2022       Lab Results   Component Value Date    GLUCOSE 77 2021    CALCIUM 8.9 2021    CO2 25 2021    BUN 11 2021    CREATININE 0.62 2021       Lab Results   Component Value Date    AST 11 2021         A/P:  Ms. Nereyda Hester is 27 year old now   POD2 s/p RLTCS @40+0  2/2 scheduled case who seems to be recovering well post partum.    - Pain: Pain well controlled. Continue current pain mangement.  - FEN: Tolerating general diet.  - Heme:  Hgb 11.7 --> 9.2, VSS   - : Voiding Spontaneously  - PPx: Encouraged ambulation, SCDs, IS  - breast feeding  - Contraception: undecided; would like to discuss at 6wk PPV    #Multiple Sclerosis  - Dx 2021   - Not currently on medication    dispo: per attending    Discussed with OB Team     Hattie Romero MD PGY1  Obstetrics and Gynecology     MD Ob/Gyn In House Note    Appreciate R1 note.  Patient was seen, examined and care discussed.  Incision is healing well with staples  in place.  Discharge home.  Follow up in one week for a staple removal.  Patient should call for appointment.     None

## 2024-01-09 ENCOUNTER — EMERGENCY (EMERGENCY)
Facility: HOSPITAL | Age: 29
LOS: 1 days | Discharge: HOME | End: 2024-01-09
Attending: EMERGENCY MEDICINE
Payer: MEDICAID

## 2024-01-09 VITALS
RESPIRATION RATE: 16 BRPM | HEART RATE: 74 BPM | WEIGHT: 265 LBS | OXYGEN SATURATION: 97 % | SYSTOLIC BLOOD PRESSURE: 113 MMHG | TEMPERATURE: 98 F | DIASTOLIC BLOOD PRESSURE: 72 MMHG

## 2024-01-09 LAB
ALBUMIN SERPL ELPH-MCNC: 3.6 G/DL — SIGNIFICANT CHANGE UP (ref 3.5–5)
ALBUMIN SERPL ELPH-MCNC: 3.6 G/DL — SIGNIFICANT CHANGE UP (ref 3.5–5)
ALP SERPL-CCNC: 81 U/L — SIGNIFICANT CHANGE UP (ref 40–120)
ALP SERPL-CCNC: 81 U/L — SIGNIFICANT CHANGE UP (ref 40–120)
ALT FLD-CCNC: 19 U/L DA — SIGNIFICANT CHANGE UP (ref 10–60)
ALT FLD-CCNC: 19 U/L DA — SIGNIFICANT CHANGE UP (ref 10–60)
ANION GAP SERPL CALC-SCNC: 2 MMOL/L — LOW (ref 5–17)
ANION GAP SERPL CALC-SCNC: 2 MMOL/L — LOW (ref 5–17)
AST SERPL-CCNC: 13 U/L — SIGNIFICANT CHANGE UP (ref 10–40)
AST SERPL-CCNC: 13 U/L — SIGNIFICANT CHANGE UP (ref 10–40)
BASOPHILS # BLD AUTO: 0.05 K/UL — SIGNIFICANT CHANGE UP (ref 0–0.2)
BASOPHILS # BLD AUTO: 0.05 K/UL — SIGNIFICANT CHANGE UP (ref 0–0.2)
BASOPHILS NFR BLD AUTO: 0.9 % — SIGNIFICANT CHANGE UP (ref 0–2)
BASOPHILS NFR BLD AUTO: 0.9 % — SIGNIFICANT CHANGE UP (ref 0–2)
BILIRUB SERPL-MCNC: 0.3 MG/DL — SIGNIFICANT CHANGE UP (ref 0.2–1.2)
BILIRUB SERPL-MCNC: 0.3 MG/DL — SIGNIFICANT CHANGE UP (ref 0.2–1.2)
BUN SERPL-MCNC: 14 MG/DL — SIGNIFICANT CHANGE UP (ref 7–18)
BUN SERPL-MCNC: 14 MG/DL — SIGNIFICANT CHANGE UP (ref 7–18)
CALCIUM SERPL-MCNC: 9.1 MG/DL — SIGNIFICANT CHANGE UP (ref 8.4–10.5)
CALCIUM SERPL-MCNC: 9.1 MG/DL — SIGNIFICANT CHANGE UP (ref 8.4–10.5)
CHLORIDE SERPL-SCNC: 107 MMOL/L — SIGNIFICANT CHANGE UP (ref 96–108)
CHLORIDE SERPL-SCNC: 107 MMOL/L — SIGNIFICANT CHANGE UP (ref 96–108)
CO2 SERPL-SCNC: 28 MMOL/L — SIGNIFICANT CHANGE UP (ref 22–31)
CO2 SERPL-SCNC: 28 MMOL/L — SIGNIFICANT CHANGE UP (ref 22–31)
CREAT SERPL-MCNC: 0.88 MG/DL — SIGNIFICANT CHANGE UP (ref 0.5–1.3)
CREAT SERPL-MCNC: 0.88 MG/DL — SIGNIFICANT CHANGE UP (ref 0.5–1.3)
EGFR: 119 ML/MIN/1.73M2 — SIGNIFICANT CHANGE UP
EGFR: 119 ML/MIN/1.73M2 — SIGNIFICANT CHANGE UP
EOSINOPHIL # BLD AUTO: 0.13 K/UL — SIGNIFICANT CHANGE UP (ref 0–0.5)
EOSINOPHIL # BLD AUTO: 0.13 K/UL — SIGNIFICANT CHANGE UP (ref 0–0.5)
EOSINOPHIL NFR BLD AUTO: 2.3 % — SIGNIFICANT CHANGE UP (ref 0–6)
EOSINOPHIL NFR BLD AUTO: 2.3 % — SIGNIFICANT CHANGE UP (ref 0–6)
FLUAV AG NPH QL: SIGNIFICANT CHANGE UP
FLUAV AG NPH QL: SIGNIFICANT CHANGE UP
FLUBV AG NPH QL: SIGNIFICANT CHANGE UP
FLUBV AG NPH QL: SIGNIFICANT CHANGE UP
GLUCOSE SERPL-MCNC: 90 MG/DL — SIGNIFICANT CHANGE UP (ref 70–99)
GLUCOSE SERPL-MCNC: 90 MG/DL — SIGNIFICANT CHANGE UP (ref 70–99)
HCT VFR BLD CALC: 40.2 % — SIGNIFICANT CHANGE UP (ref 39–50)
HCT VFR BLD CALC: 40.2 % — SIGNIFICANT CHANGE UP (ref 39–50)
HGB BLD-MCNC: 12.8 G/DL — LOW (ref 13–17)
HGB BLD-MCNC: 12.8 G/DL — LOW (ref 13–17)
IMM GRANULOCYTES NFR BLD AUTO: 0.3 % — SIGNIFICANT CHANGE UP (ref 0–0.9)
IMM GRANULOCYTES NFR BLD AUTO: 0.3 % — SIGNIFICANT CHANGE UP (ref 0–0.9)
LIDOCAIN IGE QN: 24 U/L — SIGNIFICANT CHANGE UP (ref 13–75)
LIDOCAIN IGE QN: 24 U/L — SIGNIFICANT CHANGE UP (ref 13–75)
LYMPHOCYTES # BLD AUTO: 1.76 K/UL — SIGNIFICANT CHANGE UP (ref 1–3.3)
LYMPHOCYTES # BLD AUTO: 1.76 K/UL — SIGNIFICANT CHANGE UP (ref 1–3.3)
LYMPHOCYTES # BLD AUTO: 30.8 % — SIGNIFICANT CHANGE UP (ref 13–44)
LYMPHOCYTES # BLD AUTO: 30.8 % — SIGNIFICANT CHANGE UP (ref 13–44)
MCHC RBC-ENTMCNC: 27.3 PG — SIGNIFICANT CHANGE UP (ref 27–34)
MCHC RBC-ENTMCNC: 27.3 PG — SIGNIFICANT CHANGE UP (ref 27–34)
MCHC RBC-ENTMCNC: 31.8 GM/DL — LOW (ref 32–36)
MCHC RBC-ENTMCNC: 31.8 GM/DL — LOW (ref 32–36)
MCV RBC AUTO: 85.7 FL — SIGNIFICANT CHANGE UP (ref 80–100)
MCV RBC AUTO: 85.7 FL — SIGNIFICANT CHANGE UP (ref 80–100)
MONOCYTES # BLD AUTO: 0.45 K/UL — SIGNIFICANT CHANGE UP (ref 0–0.9)
MONOCYTES # BLD AUTO: 0.45 K/UL — SIGNIFICANT CHANGE UP (ref 0–0.9)
MONOCYTES NFR BLD AUTO: 7.9 % — SIGNIFICANT CHANGE UP (ref 2–14)
MONOCYTES NFR BLD AUTO: 7.9 % — SIGNIFICANT CHANGE UP (ref 2–14)
NEUTROPHILS # BLD AUTO: 3.31 K/UL — SIGNIFICANT CHANGE UP (ref 1.8–7.4)
NEUTROPHILS # BLD AUTO: 3.31 K/UL — SIGNIFICANT CHANGE UP (ref 1.8–7.4)
NEUTROPHILS NFR BLD AUTO: 57.8 % — SIGNIFICANT CHANGE UP (ref 43–77)
NEUTROPHILS NFR BLD AUTO: 57.8 % — SIGNIFICANT CHANGE UP (ref 43–77)
NRBC # BLD: 0 /100 WBCS — SIGNIFICANT CHANGE UP (ref 0–0)
NRBC # BLD: 0 /100 WBCS — SIGNIFICANT CHANGE UP (ref 0–0)
PLATELET # BLD AUTO: 231 K/UL — SIGNIFICANT CHANGE UP (ref 150–400)
PLATELET # BLD AUTO: 231 K/UL — SIGNIFICANT CHANGE UP (ref 150–400)
POTASSIUM SERPL-MCNC: 4 MMOL/L — SIGNIFICANT CHANGE UP (ref 3.5–5.3)
POTASSIUM SERPL-MCNC: 4 MMOL/L — SIGNIFICANT CHANGE UP (ref 3.5–5.3)
POTASSIUM SERPL-SCNC: 4 MMOL/L — SIGNIFICANT CHANGE UP (ref 3.5–5.3)
POTASSIUM SERPL-SCNC: 4 MMOL/L — SIGNIFICANT CHANGE UP (ref 3.5–5.3)
PROT SERPL-MCNC: 7.6 G/DL — SIGNIFICANT CHANGE UP (ref 6–8.3)
PROT SERPL-MCNC: 7.6 G/DL — SIGNIFICANT CHANGE UP (ref 6–8.3)
RBC # BLD: 4.69 M/UL — SIGNIFICANT CHANGE UP (ref 4.2–5.8)
RBC # BLD: 4.69 M/UL — SIGNIFICANT CHANGE UP (ref 4.2–5.8)
RBC # FLD: 13.6 % — SIGNIFICANT CHANGE UP (ref 10.3–14.5)
RBC # FLD: 13.6 % — SIGNIFICANT CHANGE UP (ref 10.3–14.5)
SARS-COV-2 RNA SPEC QL NAA+PROBE: SIGNIFICANT CHANGE UP
SARS-COV-2 RNA SPEC QL NAA+PROBE: SIGNIFICANT CHANGE UP
SODIUM SERPL-SCNC: 137 MMOL/L — SIGNIFICANT CHANGE UP (ref 135–145)
SODIUM SERPL-SCNC: 137 MMOL/L — SIGNIFICANT CHANGE UP (ref 135–145)
WBC # BLD: 5.72 K/UL — SIGNIFICANT CHANGE UP (ref 3.8–10.5)
WBC # BLD: 5.72 K/UL — SIGNIFICANT CHANGE UP (ref 3.8–10.5)
WBC # FLD AUTO: 5.72 K/UL — SIGNIFICANT CHANGE UP (ref 3.8–10.5)
WBC # FLD AUTO: 5.72 K/UL — SIGNIFICANT CHANGE UP (ref 3.8–10.5)

## 2024-01-09 PROCEDURE — 85025 COMPLETE CBC W/AUTO DIFF WBC: CPT

## 2024-01-09 PROCEDURE — 83690 ASSAY OF LIPASE: CPT

## 2024-01-09 PROCEDURE — 99283 EMERGENCY DEPT VISIT LOW MDM: CPT

## 2024-01-09 PROCEDURE — 36415 COLL VENOUS BLD VENIPUNCTURE: CPT

## 2024-01-09 PROCEDURE — 87637 SARSCOV2&INF A&B&RSV AMP PRB: CPT

## 2024-01-09 PROCEDURE — 99284 EMERGENCY DEPT VISIT MOD MDM: CPT

## 2024-01-09 PROCEDURE — 80053 COMPREHEN METABOLIC PANEL: CPT

## 2024-01-09 NOTE — ED PROVIDER NOTE - PATIENT PORTAL LINK FT
You can access the FollowMyHealth Patient Portal offered by [facility] by registering at the following website: [email or web address]. By joining [employer]’s FollowMyHealth portal, you will also be able to view your health information using other applications (apps) compatible with our system. You can access the FollowMyHealth Patient Portal offered by [facility] by registering at the following website: [email or web address]. By joining [employer]’s FollowMyHealth portal, you will also be able to view your health information using other applications (apps) compatible with our system.

## 2024-01-09 NOTE — ED ADULT TRIAGE NOTE - TEMPERATURE IN FAHRENHEIT (DEGREES F)
66 years old male with a PMH of HTN, HLD, DMT2, CAD, stent, BPH and PVC's presented for elective PVC ablation on 9/4 s/p successful PVC ablation via RFV.  Post ablation teaching provided.    -Resume home meds  -Follow up with Dr. Robledo on 9/20 at 9:30am  -Stable for discharge home today 98.1

## 2024-01-09 NOTE — ED PROVIDER NOTE - OBJECTIVE STATEMENT
28-year-old male history of schizophrenia presenting with intermittent headache and epigastric pain with diarrhea since yesterday.  States that overall he has had epigastric pain on and off for a month. Worse with PO intake.  No h/o abd sx. Denies having any vomiting, diarrhea, fever, cough, chest pain or shortness of breath.  Denies any sick contacts however patient's mother is also being seen in the ED.

## 2024-01-09 NOTE — ED PROVIDER NOTE - NSFOLLOWUPINSTRUCTIONS_ED_ALL_ED_FT
Abdominal Pain    WHAT YOU NEED TO KNOW:    Abdominal pain can be dull, achy, or sharp. You may have pain in one area of your abdomen, or in your entire abdomen. Your pain may be caused by a condition such as constipation, food sensitivity or poisoning, infection, or a blockage. Abdominal pain can also be from a hernia, appendicitis, or an ulcer. Liver, gallbladder, or kidney conditions can also cause abdominal pain. The cause of your abdominal pain may not be known.  Abdominal Organs    DISCHARGE INSTRUCTIONS:    Call your local emergency number (911 in the ) if:    You have chest pain or shortness of breath.    Seek care immediately if:    You have pulsing pain in your upper abdomen or lower back that suddenly becomes constant.    Your pain is in the right lower abdominal area and worsens with movement.    You have a fever over 100.4°F (38°C) or shaking chills.    You are vomiting and cannot keep food or liquids down.    Your pain does not improve or gets worse over the next 8 to 12 hours.    You see blood in your vomit or bowel movements, or they look black and tarry.    Your skin or the whites of your eyes turn yellow.    You are a woman and have a large amount of vaginal bleeding that is not your monthly period.  Call your doctor if:    You have pain in your lower back.    You are a man and have pain in your testicles.    You have pain when you urinate.    You have questions or concerns about your condition or care.  Medicines: You may need any of the following:    Medicines may be given to calm your stomach or prevent vomiting.    Prescription pain medicine may be given. Ask your healthcare provider how to take this medicine safely. Some prescription pain medicines contain acetaminophen. Do not take other medicines that contain acetaminophen without talking to your healthcare provider. Too much acetaminophen may cause liver damage. Prescription pain medicine may cause constipation. Ask your healthcare provider how to prevent or treat constipation.    Take your medicine as directed. Contact your healthcare provider if you think your medicine is not helping or if you have side effects. Tell your provider if you are allergic to any medicine. Keep a list of the medicines, vitamins, and herbs you take. Include the amounts, and when and why you take them. Bring the list or the pill bottles to follow-up visits. Carry your medicine list with you in case of an emergency.  Manage or prevent abdominal pain:    Apply heat on your abdomen for 20 to 30 minutes every 2 hours for as many days as directed. Heat helps decrease pain and muscle spasms.    Make changes to the foods you eat, if needed. Do not eat foods that cause abdominal pain or other symptoms. Eat small meals more often. The following changes may also help:  Eat more high-fiber foods if you are constipated. High-fiber foods include fruits, vegetables, whole-grain foods, and legumes such as rai beans.        Do not eat foods that cause gas if you have bloating. Examples include broccoli, cabbage, beans, and carbonated drinks.    Do not eat foods or drinks that contain sorbitol or fructose if you have diarrhea and bloating. Some examples are fruit juices, candy, jelly, and sugar-free gum.    Do not eat high-fat foods. Examples include fried foods, cheeseburgers, hot dogs, and desserts.    Make changes to the liquids you drink, if needed. Do not drink liquids that cause pain or make it worse, such as orange juice. Drink liquids throughout the day to stay hydrated. The following changes may also help:  Drink more liquids to prevent dehydration from diarrhea or vomiting. Ask your healthcare provider how much liquid to drink each day and which liquids are best for you.    Limit or do not have caffeine. Caffeine may make symptoms such as heartburn or nausea worse.    Limit or do not drink alcohol. Alcohol can make your abdominal pain worse. Ask your healthcare provider if it is okay for you to drink alcohol. Also ask how much is okay for you to drink. A drink of alcohol is 12 ounces of beer, ½ ounce of liquor, or 5 ounces of wine.    Keep a diary of your abdominal pain. A diary may help your healthcare provider learn what is causing your pain. Include when the pain happens, how long it lasts, and what the pain feels like. Write down any other symptoms you have with abdominal pain. Also write down what you eat, and any symptoms you have after you eat.    Manage stress. Stress may cause abdominal pain. Your healthcare provider may recommend relaxation techniques and deep breathing exercises to help decrease your stress. Your healthcare provider may recommend you talk to someone about your stress or anxiety, such as a counselor or a friend. Get plenty of sleep. Exercise regularly.   Family Walking for Exercise      Do not smoke. Nicotine and other chemicals in cigarettes can damage your esophagus and stomach. Ask your healthcare provider for information if you currently smoke and need help to quit. E-cigarettes or smokeless tobacco still contain nicotine. Talk to your healthcare provider before you use these products.  Follow up with your doctor as directed: Write down your questions so you remember to ask them during your visits.

## 2024-01-09 NOTE — ED PROVIDER NOTE - CLINICAL SUMMARY MEDICAL DECISION MAKING FREE TEXT BOX
28-year-old well-appearing male here for evaluation of acute epigastric pain.  More recently with nausea and headache with diarrhea.  Patient is well-appearing in no acute distress.  Nontender abdomen. Normal neuroexam.  Felt hungry and ate full breakfast in ED without difficulty.  Plan to send labs and viral swab and reassess.

## 2024-01-09 NOTE — ED PROVIDER NOTE - CPE EDP NEURO NORM
Problem: VTE, Risk for  Goal: # No s/s of VTE  Outcome: Outcome Met, Continue evaluating goal progress toward completion  Goal: # Verbalizes understanding of VTE risk factors and prevention  Description: Document education using the patient education activity.   Outcome: Outcome Met, Continue evaluating goal progress toward completion     Problem: At Risk for Falls  Goal: # Patient does not fall  Outcome: Outcome Met, Continue evaluating goal progress toward completion  Goal: # Takes action to control fall-related risks  Outcome: Outcome Met, Continue evaluating goal progress toward completion     Problem: Diabetes  Goal: Glycemic balance achieved/maintained  Description: Goal is to maintain blood sugar within range with no episodes of hypoglycemia  Outcome: Outcome Met, Continue evaluating goal progress toward completion     Problem: Pressure Injury, Risk for  Goal: No new pressure injury (PI) development  Outcome: Outcome Met, Continue evaluating goal progress toward completion     Problem: Pain  Goal: #Acceptable pain level achieved/maintained at rest using NRS/Faces  Description: This goal is used for patients who can self-report.  Acceptable means the level is at or below the identified comfort/function goal.  Outcome: Outcome Met, Continue evaluating goal progress toward completion     Problem: Pressure Injury, Risk for  Goal: # Skin remains intact  Outcome: Outcome Not Met, Continue to Monitor      normal...

## 2024-02-01 NOTE — ED PROVIDER NOTE - NS ED ATTENDING STATEMENT MOD
Detail Level: Detailed Add 90455 Cpt? (Important Note: In 2017 The Use Of 29049 Is Being Tracked By Cms To Determine Future Global Period Reimbursement For Global Periods): yes Attending Only

## 2024-04-25 ENCOUNTER — EMERGENCY (EMERGENCY)
Facility: HOSPITAL | Age: 29
LOS: 1 days | Discharge: HOME | End: 2024-04-25
Attending: EMERGENCY MEDICINE
Payer: MEDICAID

## 2024-04-25 VITALS
HEART RATE: 79 BPM | RESPIRATION RATE: 16 BRPM | HEIGHT: 67 IN | WEIGHT: 257.06 LBS | OXYGEN SATURATION: 96 % | DIASTOLIC BLOOD PRESSURE: 72 MMHG | TEMPERATURE: 98 F | SYSTOLIC BLOOD PRESSURE: 107 MMHG

## 2024-04-25 LAB
FLUAV AG NPH QL: SIGNIFICANT CHANGE UP
FLUBV AG NPH QL: SIGNIFICANT CHANGE UP
SARS-COV-2 RNA SPEC QL NAA+PROBE: SIGNIFICANT CHANGE UP

## 2024-04-25 PROCEDURE — 87637 SARSCOV2&INF A&B&RSV AMP PRB: CPT

## 2024-04-25 PROCEDURE — 99283 EMERGENCY DEPT VISIT LOW MDM: CPT

## 2024-04-25 NOTE — ED PROVIDER NOTE - PATIENT PORTAL LINK FT
You can access the FollowMyHealth Patient Portal offered by [facility] by registering at the following website: [email or web address]. By joining [employer]’s FollowMyHealth portal, you will also be able to view your health information using other applications (apps) compatible with our system.

## 2024-04-25 NOTE — ED ADULT NURSE NOTE - NSFALLUNIVINTERV_ED_ALL_ED
Bed/Stretcher in lowest position, wheels locked, appropriate side rails in place/Call bell, personal items and telephone in reach/Instruct patient to call for assistance before getting out of bed/chair/stretcher/Non-slip footwear applied when patient is off stretcher/[place] to call system/Physically safe environment - no spills, clutter or unnecessary equipment/Purposeful proactive rounding/Room/bathroom lighting operational, light cord in reach

## 2024-04-25 NOTE — ED PROVIDER NOTE - CLINICAL SUMMARY MEDICAL DECISION MAKING FREE TEXT BOX
28-year-old male with a past medical history of schizophrenia, depression, anxiety presents with nasal congestion, headache and subjective fevers.  Patient denies cough, sore throat, chest pain, shortness of breath, back pain, body aches.  Has not taken any medications for symptoms.    Well appearing on exam. Likely viral illness, possibly covid or the Flu. Will test for covid/flu and dc home with supportive care.

## 2024-04-25 NOTE — ED PROVIDER NOTE - SKIN, MLM
MRN:[id number]                      After Visit Summary   1/2/2018    Darwin Laws    MRN: [id number]           Visit Information        Provider Department      1/2/2018 10:00 AM Karen Peterson AuD; [facility] ROOM 2 [facility]        MyChart Information     [employer] lets you send messages to your doctor, view your test results, renew your prescriptions, schedule appointments and more. To sign up, go to [email or web address], contact your [facility] clinic or call [phone] during business hours.            Care EveryWhere ID     This is your Care EveryWhere ID. This could be used by other organizations to access your [facility] medical records  [id number]        Equal Access to Services     SURAJ RICHMOND : Isidra Kaur, wabarrerada carla, qaybta kaalmada alex, salvador gonzalez. So [facility] [phone].    ATENCIÓN: Si habla español, tiene a bauer disposición servicios gratuitos de asistencia lingüística. Llame al [phone].    We comply with applicable federal civil rights laws and [place] laws. We do not discriminate on the basis of race, color, national origin, age, disability, sex, sexual orientation, or gender identity.             Skin normal color for race, warm, dry and intact. No evidence of rash.

## 2024-04-25 NOTE — ED PROVIDER NOTE - OBJECTIVE STATEMENT
28-year-old male with a past medical history of schizophrenia, depression, anxiety presents with nasal congestion, headache and subjective fevers.  Patient denies cough, sore throat, chest pain, shortness of breath, back pain, body aches.  Has not taken any medications for symptoms.

## 2024-04-25 NOTE — ED PROVIDER NOTE - NSFOLLOWUPINSTRUCTIONS_ED_ALL_ED_FT
Take motrin and/or tylenol as needed for fever or pain     You can take over the counter cold medications such as DayQuil or NyQuil severe to treat symptoms    For sore throat you can use Cepacol Lozenges.     If you experience any new or worsening symptoms such as chest pain or difficulty breathing or if you are concerned you can always come back to the emergency for a re-evaluation.

## 2024-04-29 ENCOUNTER — EMERGENCY (EMERGENCY)
Facility: HOSPITAL | Age: 29
LOS: 1 days | Discharge: HOME | End: 2024-04-29
Attending: STUDENT IN AN ORGANIZED HEALTH CARE EDUCATION/TRAINING PROGRAM
Payer: MEDICAID

## 2024-04-29 VITALS
WEIGHT: 257.94 LBS | HEART RATE: 98 BPM | TEMPERATURE: 98 F | SYSTOLIC BLOOD PRESSURE: 109 MMHG | HEIGHT: 67 IN | OXYGEN SATURATION: 96 % | RESPIRATION RATE: 18 BRPM | DIASTOLIC BLOOD PRESSURE: 59 MMHG

## 2024-04-29 VITALS
TEMPERATURE: 98 F | HEART RATE: 77 BPM | DIASTOLIC BLOOD PRESSURE: 70 MMHG | RESPIRATION RATE: 18 BRPM | OXYGEN SATURATION: 97 % | SYSTOLIC BLOOD PRESSURE: 115 MMHG

## 2024-04-29 LAB
FLUAV AG NPH QL: SIGNIFICANT CHANGE UP
FLUBV AG NPH QL: SIGNIFICANT CHANGE UP
SARS-COV-2 RNA SPEC QL NAA+PROBE: DETECTED

## 2024-04-29 PROCEDURE — 99283 EMERGENCY DEPT VISIT LOW MDM: CPT | Mod: 25

## 2024-04-29 PROCEDURE — 71046 X-RAY EXAM CHEST 2 VIEWS: CPT

## 2024-04-29 PROCEDURE — 87637 SARSCOV2&INF A&B&RSV AMP PRB: CPT

## 2024-04-29 PROCEDURE — 99284 EMERGENCY DEPT VISIT MOD MDM: CPT

## 2024-04-29 PROCEDURE — 71046 X-RAY EXAM CHEST 2 VIEWS: CPT | Mod: 26

## 2024-04-29 PROCEDURE — 94640 AIRWAY INHALATION TREATMENT: CPT

## 2024-04-29 RX ORDER — ACETAMINOPHEN 500 MG
650 TABLET ORAL ONCE
Refills: 0 | Status: COMPLETED | OUTPATIENT
Start: 2024-04-29 | End: 2024-04-29

## 2024-04-29 RX ORDER — ALBUTEROL 90 UG/1
4 AEROSOL, METERED ORAL ONCE
Refills: 0 | Status: COMPLETED | OUTPATIENT
Start: 2024-04-29 | End: 2024-04-29

## 2024-04-29 RX ORDER — IBUPROFEN 200 MG
400 TABLET ORAL ONCE
Refills: 0 | Status: COMPLETED | OUTPATIENT
Start: 2024-04-29 | End: 2024-04-29

## 2024-04-29 RX ADMIN — ALBUTEROL 4 PUFF(S): 90 AEROSOL, METERED ORAL at 06:26

## 2024-04-29 RX ADMIN — Medication 400 MILLIGRAM(S): at 06:26

## 2024-04-29 RX ADMIN — Medication 650 MILLIGRAM(S): at 06:26

## 2024-04-29 NOTE — ED PROVIDER NOTE - OBJECTIVE STATEMENT
28-year-old male with a past medical history of schizophrenia, depression, anxiety presents with nasal congestion, cough, headache. Patient was seen here 4 days ago on 4/25 where he had a negative flu with COVID swab and was discharged.  Patient concerned as his mother recently tested positive for COVID.  Still endorsing sore throat, and shortness of breath, and phlegm sensation in his throat.  No nausea, vomiting, fever or chills.

## 2024-04-29 NOTE — ED PROVIDER NOTE - NSFOLLOWUPINSTRUCTIONS_ED_ALL_ED_FT
– Return for any worsening or concerning symptoms (see below).  – Follow up with primary care doctor in the next 5 to 7 days.     You have a viral syndrome.     This can last from 5-10 days. Alternate Tylenol and Motrin every 4-6 hours for fever control as well as body aches and chills. Drink plenty of fluids. Rest. Return to the emergency room for worsening condition or new concerning symptoms. Follow up with your regular doctor. If you don't have a regular doctor use one of the numbers below to establish a primary care doctor.    A viral infection can be caused by different types of viruses. Most viral infections are not serious and resolve on their own. However, some infections may cause severe symptoms and may lead to further complications    SYMPTOMS  Viruses can frequently cause:  Minor sore throat.   Aches and pains.   Headaches.   Runny nose.   Different types of rashes.   Watery eyes.   Tiredness.   Cough.   Loss of appetite.   Gastrointestinal infections, resulting in nausea, vomiting, and diarrhea.   These symptoms do not respond to antibiotics because the infection is not caused by bacteria.     However, you might catch a bacterial infection following the viral infection. This is sometimes called a "superinfection."    Symptoms of such a bacterial infection may include:  Worsening sore throat with pus and difficulty swallowing.   Swollen neck glands. Chills and a high or persistent fever.   Severe headache.   Tenderness over the sinuses.   Persistent overall ill feeling (malaise), muscle aches, and tiredness (fatigue).   Persistent cough.   Yellow, green, or brown mucus production with coughing.     HOME CARE INSTRUCTIONS   Only take over-the-counter or prescription medicines for pain, discomfort, diarrhea, or fever as directed by your caregiver. Drink enough water and fluids to keep your urine clear or pale yellow. Sports drinks can provide valuable electrolytes, sugars, and hydration. Get plenty of rest and maintain proper nutrition. Soups and broths with crackers or rice are fine.     SEEK IMMEDIATE MEDICAL CARE IF: You have severe headaches, shortness of breath, chest pain, neck pain, or an unusual rash. You have uncontrolled vomiting, diarrhea, or you are unable to keep down fluids. – Your chest X-ray was normal.   – Return for any worsening or concerning symptoms (see below).  – Follow up with primary care doctor in the next 5 to 7 days.     You have a viral syndrome.     This can last from 5-10 days. Alternate Tylenol and Motrin every 4-6 hours for fever control as well as body aches and chills. Drink plenty of fluids. Rest. Return to the emergency room for worsening condition or new concerning symptoms. Follow up with your regular doctor. If you don't have a regular doctor use one of the numbers below to establish a primary care doctor.    A viral infection can be caused by different types of viruses. Most viral infections are not serious and resolve on their own. However, some infections may cause severe symptoms and may lead to further complications    SYMPTOMS  Viruses can frequently cause:  Minor sore throat.   Aches and pains.   Headaches.   Runny nose.   Different types of rashes.   Watery eyes.   Tiredness.   Cough.   Loss of appetite.   Gastrointestinal infections, resulting in nausea, vomiting, and diarrhea.   These symptoms do not respond to antibiotics because the infection is not caused by bacteria.     However, you might catch a bacterial infection following the viral infection. This is sometimes called a "superinfection."    Symptoms of such a bacterial infection may include:  Worsening sore throat with pus and difficulty swallowing.   Swollen neck glands. Chills and a high or persistent fever.   Severe headache.   Tenderness over the sinuses.   Persistent overall ill feeling (malaise), muscle aches, and tiredness (fatigue).   Persistent cough.   Yellow, green, or brown mucus production with coughing.     HOME CARE INSTRUCTIONS   Only take over-the-counter or prescription medicines for pain, discomfort, diarrhea, or fever as directed by your caregiver. Drink enough water and fluids to keep your urine clear or pale yellow. Sports drinks can provide valuable electrolytes, sugars, and hydration. Get plenty of rest and maintain proper nutrition. Soups and broths with crackers or rice are fine.     SEEK IMMEDIATE MEDICAL CARE IF: You have severe headaches, shortness of breath, chest pain, neck pain, or an unusual rash. You have uncontrolled vomiting, diarrhea, or you are unable to keep down fluids.

## 2024-04-29 NOTE — ED PROVIDER NOTE - PATIENT PORTAL LINK FT
You can access the FollowMyHealth Patient Portal offered by [facility] by registering at the following website: [email or web address]. By joining [employer]’s FollowMyHealth portal, you will also be able to view your health information using other applications (apps) compatible with our system.

## 2024-04-29 NOTE — ED PROVIDER NOTE - CLINICAL SUMMARY MEDICAL DECISION MAKING FREE TEXT BOX
28-year-old male, presenting with chief complaint of URI symptoms with cough, nasal congestion, and headache.  Patient recently seen with negative flu with COVID swab.  Patient concerned as mom tested positive for COVID.    Vital signs stable, patient very well-appearing.  ENT exam within normal limits, no tonsillar exudates, erythema, or uvular deviation appreciated to suggest tonsillitis versus pharyngitis versus peritonsillar abscess.  Patient most likely with viral URI not detected on flu with COVID swab.   Will provide symptomatic relief with Motrin, Tylenol, and albuterol. Will obtain chest x-ray to evaluate for consolidations.   Anticipate discharge with outpatient follow-up.

## 2024-04-29 NOTE — ED ADULT TRIAGE NOTE - CHIEF COMPLAINT QUOTE
pt reports that he has cough , sore throat . difficulty breathing for 2 days . he states that his mother has similar symptoms  and was COVID POSITIVE . so he wants  to know  his Covid status

## 2024-04-29 NOTE — ED PROVIDER NOTE - PHYSICAL EXAMINATION
Gen: NAD; well appearing  Head: NCAT  ENT: mucous membranes moist, no discharge. no tonsillar exudates appreciated. no uvular deviation  Neck: neck supple. +submandibular LN TTP  Resp: CTAB, no W/R/R  CV: RRR, +S1/S2, no M/R/G  GI: Abdomen soft non-distended, NTTP, no masses  MSK: No open wounds, no bruising, no lower extremity edema  Skin: no rash or bruising

## 2024-06-30 ENCOUNTER — EMERGENCY (EMERGENCY)
Facility: HOSPITAL | Age: 29
LOS: 1 days | Discharge: HOME | End: 2024-06-30
Attending: STUDENT IN AN ORGANIZED HEALTH CARE EDUCATION/TRAINING PROGRAM
Payer: MEDICAID

## 2024-06-30 VITALS
RESPIRATION RATE: 16 BRPM | WEIGHT: 248.02 LBS | OXYGEN SATURATION: 98 % | DIASTOLIC BLOOD PRESSURE: 78 MMHG | SYSTOLIC BLOOD PRESSURE: 116 MMHG | HEIGHT: 67 IN | HEART RATE: 80 BPM | TEMPERATURE: 98 F

## 2024-06-30 VITALS
DIASTOLIC BLOOD PRESSURE: 75 MMHG | TEMPERATURE: 98 F | RESPIRATION RATE: 18 BRPM | OXYGEN SATURATION: 97 % | SYSTOLIC BLOOD PRESSURE: 125 MMHG | HEART RATE: 78 BPM

## 2024-06-30 LAB
ALBUMIN SERPL ELPH-MCNC: 3.5 G/DL — SIGNIFICANT CHANGE UP (ref 3.5–5)
ALP SERPL-CCNC: 87 U/L — SIGNIFICANT CHANGE UP (ref 40–120)
ALT FLD-CCNC: 17 U/L DA — SIGNIFICANT CHANGE UP (ref 10–60)
ANION GAP SERPL CALC-SCNC: 6 MMOL/L — SIGNIFICANT CHANGE UP (ref 5–17)
APPEARANCE UR: CLEAR — SIGNIFICANT CHANGE UP
AST SERPL-CCNC: 7 U/L — LOW (ref 10–40)
BACTERIA # UR AUTO: NEGATIVE /HPF — SIGNIFICANT CHANGE UP
BASOPHILS # BLD AUTO: 0.06 K/UL — SIGNIFICANT CHANGE UP (ref 0–0.2)
BASOPHILS NFR BLD AUTO: 0.9 % — SIGNIFICANT CHANGE UP (ref 0–2)
BILIRUB SERPL-MCNC: 0.3 MG/DL — SIGNIFICANT CHANGE UP (ref 0.2–1.2)
BILIRUB UR-MCNC: NEGATIVE — SIGNIFICANT CHANGE UP
BUN SERPL-MCNC: 14 MG/DL — SIGNIFICANT CHANGE UP (ref 7–18)
CALCIUM SERPL-MCNC: 8.5 MG/DL — SIGNIFICANT CHANGE UP (ref 8.4–10.5)
CHLORIDE SERPL-SCNC: 107 MMOL/L — SIGNIFICANT CHANGE UP (ref 96–108)
CO2 SERPL-SCNC: 25 MMOL/L — SIGNIFICANT CHANGE UP (ref 22–31)
COLOR SPEC: YELLOW — SIGNIFICANT CHANGE UP
CREAT SERPL-MCNC: 0.87 MG/DL — SIGNIFICANT CHANGE UP (ref 0.5–1.3)
DIFF PNL FLD: NEGATIVE — SIGNIFICANT CHANGE UP
EGFR: 119 ML/MIN/1.73M2 — SIGNIFICANT CHANGE UP
EOSINOPHIL # BLD AUTO: 0.16 K/UL — SIGNIFICANT CHANGE UP (ref 0–0.5)
EOSINOPHIL NFR BLD AUTO: 2.3 % — SIGNIFICANT CHANGE UP (ref 0–6)
EPI CELLS # UR: SIGNIFICANT CHANGE UP
GLUCOSE SERPL-MCNC: 92 MG/DL — SIGNIFICANT CHANGE UP (ref 70–99)
GLUCOSE UR QL: NEGATIVE MG/DL — SIGNIFICANT CHANGE UP
HCT VFR BLD CALC: 41.5 % — SIGNIFICANT CHANGE UP (ref 39–50)
HGB BLD-MCNC: 13.4 G/DL — SIGNIFICANT CHANGE UP (ref 13–17)
IMM GRANULOCYTES NFR BLD AUTO: 0.1 % — SIGNIFICANT CHANGE UP (ref 0–0.9)
KETONES UR-MCNC: NEGATIVE MG/DL — SIGNIFICANT CHANGE UP
LEUKOCYTE ESTERASE UR-ACNC: NEGATIVE — SIGNIFICANT CHANGE UP
LIDOCAIN IGE QN: 34 U/L — SIGNIFICANT CHANGE UP (ref 13–75)
LYMPHOCYTES # BLD AUTO: 2.16 K/UL — SIGNIFICANT CHANGE UP (ref 1–3.3)
LYMPHOCYTES # BLD AUTO: 31.1 % — SIGNIFICANT CHANGE UP (ref 13–44)
MCHC RBC-ENTMCNC: 27.6 PG — SIGNIFICANT CHANGE UP (ref 27–34)
MCHC RBC-ENTMCNC: 32.3 GM/DL — SIGNIFICANT CHANGE UP (ref 32–36)
MCV RBC AUTO: 85.6 FL — SIGNIFICANT CHANGE UP (ref 80–100)
MONOCYTES # BLD AUTO: 0.57 K/UL — SIGNIFICANT CHANGE UP (ref 0–0.9)
MONOCYTES NFR BLD AUTO: 8.2 % — SIGNIFICANT CHANGE UP (ref 2–14)
NEUTROPHILS # BLD AUTO: 3.99 K/UL — SIGNIFICANT CHANGE UP (ref 1.8–7.4)
NEUTROPHILS NFR BLD AUTO: 57.4 % — SIGNIFICANT CHANGE UP (ref 43–77)
NITRITE UR-MCNC: NEGATIVE — SIGNIFICANT CHANGE UP
NRBC # BLD: 0 /100 WBCS — SIGNIFICANT CHANGE UP (ref 0–0)
PH UR: 5.5 — SIGNIFICANT CHANGE UP (ref 5–8)
PLATELET # BLD AUTO: 266 K/UL — SIGNIFICANT CHANGE UP (ref 150–400)
POTASSIUM SERPL-MCNC: 4.1 MMOL/L — SIGNIFICANT CHANGE UP (ref 3.5–5.3)
POTASSIUM SERPL-SCNC: 4.1 MMOL/L — SIGNIFICANT CHANGE UP (ref 3.5–5.3)
PROT SERPL-MCNC: 7.3 G/DL — SIGNIFICANT CHANGE UP (ref 6–8.3)
PROT UR-MCNC: NEGATIVE MG/DL — SIGNIFICANT CHANGE UP
RBC # BLD: 4.85 M/UL — SIGNIFICANT CHANGE UP (ref 4.2–5.8)
RBC # FLD: 13.4 % — SIGNIFICANT CHANGE UP (ref 10.3–14.5)
RBC CASTS # UR COMP ASSIST: SIGNIFICANT CHANGE UP /HPF
SODIUM SERPL-SCNC: 138 MMOL/L — SIGNIFICANT CHANGE UP (ref 135–145)
SP GR SPEC: 1.01 — SIGNIFICANT CHANGE UP (ref 1–1.03)
UROBILINOGEN FLD QL: 0.2 MG/DL — SIGNIFICANT CHANGE UP (ref 0.2–1)
WBC # BLD: 6.95 K/UL — SIGNIFICANT CHANGE UP (ref 3.8–10.5)
WBC # FLD AUTO: 6.95 K/UL — SIGNIFICANT CHANGE UP (ref 3.8–10.5)
WBC UR QL: 2 /HPF — SIGNIFICANT CHANGE UP (ref 0–5)

## 2024-06-30 PROCEDURE — 74177 CT ABD & PELVIS W/CONTRAST: CPT | Mod: 26,MC

## 2024-06-30 PROCEDURE — 99285 EMERGENCY DEPT VISIT HI MDM: CPT

## 2024-06-30 PROCEDURE — 83690 ASSAY OF LIPASE: CPT

## 2024-06-30 PROCEDURE — 80053 COMPREHEN METABOLIC PANEL: CPT

## 2024-06-30 PROCEDURE — 85025 COMPLETE CBC W/AUTO DIFF WBC: CPT

## 2024-06-30 PROCEDURE — 99284 EMERGENCY DEPT VISIT MOD MDM: CPT | Mod: 25

## 2024-06-30 PROCEDURE — 81001 URINALYSIS AUTO W/SCOPE: CPT

## 2024-06-30 PROCEDURE — 36415 COLL VENOUS BLD VENIPUNCTURE: CPT

## 2024-06-30 PROCEDURE — 74177 CT ABD & PELVIS W/CONTRAST: CPT | Mod: MC

## 2024-06-30 RX ORDER — SODIUM CHLORIDE 0.9 % (FLUSH) 0.9 %
1000 SYRINGE (ML) INJECTION ONCE
Refills: 0 | Status: COMPLETED | OUTPATIENT
Start: 2024-06-30 | End: 2024-06-30

## 2024-06-30 RX ADMIN — Medication 1000 MILLILITER(S): at 15:57
